# Patient Record
Sex: FEMALE | Race: BLACK OR AFRICAN AMERICAN | URBAN - METROPOLITAN AREA
[De-identification: names, ages, dates, MRNs, and addresses within clinical notes are randomized per-mention and may not be internally consistent; named-entity substitution may affect disease eponyms.]

---

## 2017-01-24 ENCOUNTER — OFFICE VISIT (OUTPATIENT)
Dept: INTERNAL MEDICINE | Facility: CLINIC | Age: 42
End: 2017-01-24
Payer: COMMERCIAL

## 2017-01-24 VITALS
SYSTOLIC BLOOD PRESSURE: 160 MMHG | BODY MASS INDEX: 48.09 KG/M2 | OXYGEN SATURATION: 98 % | HEART RATE: 66 BPM | DIASTOLIC BLOOD PRESSURE: 110 MMHG | HEIGHT: 64 IN | TEMPERATURE: 98.3 F | WEIGHT: 281.7 LBS

## 2017-01-24 DIAGNOSIS — S93.402A MILD SPRAIN OF LEFT ANKLE, INITIAL ENCOUNTER: ICD-10-CM

## 2017-01-24 DIAGNOSIS — I10 BENIGN ESSENTIAL HYPERTENSION: Primary | ICD-10-CM

## 2017-01-24 DIAGNOSIS — N18.30 CHRONIC KIDNEY DISEASE, STAGE III (MODERATE) (H): ICD-10-CM

## 2017-01-24 LAB
ANION GAP SERPL CALCULATED.3IONS-SCNC: 6 MMOL/L (ref 3–14)
BUN SERPL-MCNC: 14 MG/DL (ref 7–30)
CALCIUM SERPL-MCNC: 8.8 MG/DL (ref 8.5–10.1)
CHLORIDE SERPL-SCNC: 110 MMOL/L (ref 94–109)
CO2 SERPL-SCNC: 27 MMOL/L (ref 20–32)
CREAT SERPL-MCNC: 1.51 MG/DL (ref 0.52–1.04)
GFR SERPL CREATININE-BSD FRML MDRD: 38 ML/MIN/1.7M2
GLUCOSE SERPL-MCNC: 91 MG/DL (ref 70–99)
POTASSIUM SERPL-SCNC: 4.2 MMOL/L (ref 3.4–5.3)
SODIUM SERPL-SCNC: 143 MMOL/L (ref 133–144)

## 2017-01-24 PROCEDURE — 36415 COLL VENOUS BLD VENIPUNCTURE: CPT | Performed by: INTERNAL MEDICINE

## 2017-01-24 PROCEDURE — 80048 BASIC METABOLIC PNL TOTAL CA: CPT | Performed by: INTERNAL MEDICINE

## 2017-01-24 PROCEDURE — 99214 OFFICE O/P EST MOD 30 MIN: CPT | Performed by: INTERNAL MEDICINE

## 2017-01-24 NOTE — Clinical Note
[address] [address]  [phone]      01/24/2017      Nikunj Lizama  1975  [address] [facility] [address] [place] [address]        To whom it may concern,    Please excuse Ms. Lizama from work today. She was seeing me to discuss a couple health concerns that I am following her for. She may return to work tomorrow.    Please contact me with any question or concerns.        Britney Jean Baptiste MD   [address] [address]  T: [phone], F: [phone]

## 2017-01-24 NOTE — PROGRESS NOTES
"  SUBJECTIVE:                                                      HPI: Nikunj Lizama is a pleasant 42 year old female who presents for BP follow-up and left ankle pain:    Re: BP:  - lisinopril increased from 20 to 40mg daily at last visit  - diltiazem 120mg was started  - hydrochlorothiazide and amlodipine were stopped    - patient tolerating medications well - no adverse side effects  - not checking BPs at home  - BP significantly elevated today - no change from last visit  - asymptomatic:   - no headaches or vision changes   - no chest pain or palpitations   - no shortness of breath   - no light-headedness     Re: left ankle:  - woke up yesterday to ankle being swollen and painful  - no known precipitating event - no trauma, fall, misstep, or unusual exertion  - describes pain as \"crampy\" - denies soreness/tenderness or sharp pain  - pain is location along proximal, dorsal aspect of foot and lateral aspect of ankle  - pain is worse with weight-bearing - having trouble standing and walking  - has been using ibuprofen - seems to help    - patient presented with similar pain at last visit  - reports history of recurrent left ankle pain    - no redness or skin changes  - no weakness, numbness, or tingling of ankle or foot  - no ankle or foot weakness    - no chest pain or palpitations  - no shortness of breath or cough  - no light-headedness or pre-syncope    The medication, allergy, and problem lists have been reviewed and updated as appropriate.     OBJECTIVE:                                                      /110 mmHg  Pulse 66  Temp(Src) 98.3  F (36.8  C) (Oral)  Ht 5' 4.4\" (1.636 m)  Wt 281 lb 11.2 oz (127.778 kg)  BMI 47.74 kg/m2  SpO2 98%  LMP 01/19/2017  Constitutional: well-appearing  Respiratory: normal respiratory effort; clear to auscultation bilaterally  Cardiovascular: regular rate and rhythm; no edema  Left ankle:  Inspection: mild, non-pitting swelling (compared with right " ankle)  Tender: mild tenderness over ATFL, CFL, PTFL,    Non-tender: medial malleolus, anterior tib-fib ligament, achilles tendon  Range of Motion: dorsiflexion, plantarflexion, inversion, eversion all mildly decreased due to pain  Strength: normal    ASSESSMENT/PLAN:                                                      (I10) Benign essential hypertension  (primary encounter diagnosis)  (N18.3) Chronic kidney disease, stage III (moderate)  Comment:    - poorly controlled at present.   - caution with renally active agents in light of CKD (lisinopril actually recommended in setting of CKD with proteinuria).   Plan:    - BMP today.   - if renal function stable, will continue current regimen, but increase diltiazem to 240mg and add diuretic (Lasix 20mg).   - follow-up BP in 2-4 weeks max.     (S93.402A) Mild sprain of left ankle, initial encounter  Comment: this seems to be a recurring ailment.   Plan:    - fitted with hard walking boot - to wear for next 2 weeks at least.   - keep leg elevated when seated or lying down.   - wear compression dressing during day.   - cool compresses to area 3x/day.   - ibuprofen PRN for pain relief.   - consider course of PT to prevent future recurrences - patient will consider.     The instructions on the AVS were discussed and explained to the patient. Patient expressed understanding of instructions.    Britney Jean Baptiste MD   [place] [address] [place] [address]  T: [phone], F: [phone]

## 2017-01-24 NOTE — MR AVS SNAPSHOT
After Visit Summary   1/24/2017    Nikunj Lizama    MRN: [id number]           Patient Information     Date Of Birth          1975        Visit Information        Provider Department      1/24/2017 10:15 AM Britney Jean Baptiste MD [facility]        Today's Diagnoses     Benign essential hypertension    -  1     Mild sprain of left ankle, initial encounter           Care Instructions    Blood test today.    ---    I will most likely be adding a water pill to your regimen - will call you after blood test result is back.    ---    For left ankle:    - wear hard boot when walking/weight-bearing for next 2 weeks (at least).  - cool compresses to ankle 3x/day.  - wrap ankle in ace bandage  - keep leg elevated when off your feet        Follow-ups after your visit        Your next 10 appointments already scheduled     Jan 31, 2017 10:30 AM   PHYSICAL with Britney Jean Baptiste MD   [facility] ([facility])    [address] [place] [id number] [phone]              Who to contact     If you have questions or need follow up information about today's clinic visit or your schedule please contact [facility] directly at [phone].  Normal or non-critical lab and imaging results will be communicated to you by [employer], letter or phone within 4 business days after the clinic has received the results. If you do not hear from us within 7 days, please contact the clinic through [employer] or phone. If you have a critical or abnormal lab result, we will notify you by phone as soon as possible.  Submit refill requests through [employer] or call your pharmacy and they will forward the refill request to us. Please allow 3 business days for your refill to be completed.          Additional Information About Your Visit        [employer] Information     [employer] lets you send messages to your doctor, view your test  "results, renew your prescriptions, schedule appointments and more. To sign up, go to [email or web address] . Click on \"Log in\" on the left side of the screen, which will take you to the Welcome page. Then click on \"Sign up Now\" on the right side of the page.     You will be asked to enter the access code listed below, as well as some personal information. Please follow the directions to create your username and password.     Your access code is: [email or web address]  Expires: 3/23/2017  3:10 PM     Your access code will  in 90 days. If you need help or a new code, please call your [facility] clinic or [phone].        Care EveryWhere ID     This is your Care EveryWhere ID. This could be used by other organizations to access your [facility] medical records  [id number]        Your Vitals Were     Pulse Temperature Height BMI (Body Mass Index) Pulse Oximetry Last Period    66 98.3  F (36.8  C) (Oral) 5' 4.4\" (1.636 m) 47.74 kg/m2 98% 2017       Blood Pressure from Last 3 Encounters:   17 160/110   16 160/110    Weight from Last 3 Encounters:   17 281 lb 11.2 oz (127.778 kg)   16 281 lb (127.461 kg)              We Performed the Following     Basic metabolic panel          Today's Medication Changes          These changes are accurate as of: 17 10:38 AM.  If you have any questions, ask your nurse or doctor.               Start taking these medicines.        Dose/Directions    order for DME   Used for:  Mild sprain of left ankle, initial encounter   Started by:  Britney Jean Baptiste MD        Equipment being ordered: left CAM boot   Quantity:  1 Units   Refills:  0            Where to get your medicines      Some of these will need a paper prescription and others can be bought over the counter.  Ask your nurse if you have questions.     Bring a paper prescription for each of these medications    - order for DME             Primary Care Provider    None Specified       No primary " provider on file.        Thank you!     Thank you for choosing [facility]  for your care. Our goal is always to provide you with excellent care. Hearing back from our patients is one way we can continue to improve our services. Please take a few minutes to complete the written survey that you may receive in the mail after your visit with us. Thank you!             Your Updated Medication List - Protect others around you: Learn how to safely use, store and throw away your medicines at www.disposemymeds.org.          This list is accurate as of: 1/24/17 10:38 AM.  Always use your most recent med list.                   Brand Name Dispense Instructions for use    diltiazem 120 MG 24 hr capsule    DILACOR XR    30 capsule    Take 1 capsule (120 mg) by mouth daily       lisinopril 40 MG tablet    PRINIVIL/ZESTRIL    30 tablet    Take 1 tablet (40 mg) by mouth daily       order for DME     1 Units    Equipment being ordered: left CAM boot

## 2017-01-24 NOTE — NURSING NOTE
"Chief Complaint   Patient presents with     Musculoskeletal Problem     x 1 day. Pain on the L foot to back of heel.       Initial /110 mmHg  Pulse 66  Temp(Src) 98.3  F (36.8  C) (Oral)  Ht 5' 4.4\" (1.636 m)  Wt 281 lb 11.2 oz (127.778 kg)  BMI 47.74 kg/m2  SpO2 98%  LMP 01/19/2017 Estimated body mass index is 47.74 kg/(m^2) as calculated from the following:    Height as of this encounter: 5' 4.4\" (1.636 m).    Weight as of this encounter: 281 lb 11.2 oz (127.778 kg).  BP completed using cuff size: large      Kaminibose MA      "

## 2017-01-24 NOTE — PATIENT INSTRUCTIONS
Blood test today.    ---    I will most likely be adding a water pill to your regimen - will call you after blood test result is back.    ---    For left ankle:    - wear hard boot when walking/weight-bearing for next 2 weeks (at least).  - cool compresses to ankle 3x/day.  - wrap ankle in ace bandage  - keep leg elevated when off your feet

## 2017-01-25 ENCOUNTER — TELEPHONE (OUTPATIENT)
Dept: INTERNAL MEDICINE | Facility: CLINIC | Age: 42
End: 2017-01-25

## 2017-01-25 DIAGNOSIS — I10 BENIGN ESSENTIAL HYPERTENSION: Primary | ICD-10-CM

## 2017-01-25 DIAGNOSIS — N18.30 CHRONIC KIDNEY DISEASE, STAGE III (MODERATE) (H): ICD-10-CM

## 2017-01-25 RX ORDER — LISINOPRIL 40 MG/1
40 TABLET ORAL DAILY
Qty: 30 TABLET | Refills: 1 | Status: SHIPPED | OUTPATIENT
Start: 2017-01-25 | End: 2017-05-15

## 2017-01-25 RX ORDER — FUROSEMIDE 20 MG
20 TABLET ORAL DAILY
Qty: 30 TABLET | Refills: 1 | Status: SHIPPED | OUTPATIENT
Start: 2017-01-25

## 2017-01-25 RX ORDER — DILTIAZEM HYDROCHLORIDE 240 MG/1
240 CAPSULE, EXTENDED RELEASE ORAL DAILY
Qty: 30 CAPSULE | Refills: 1 | Status: SHIPPED | OUTPATIENT
Start: 2017-01-25 | End: 2017-03-16

## 2017-01-25 NOTE — TELEPHONE ENCOUNTER
Labs show stable to improved Cr.    Will continue lisinopril 40mg daily.    Will increase diltiazem XR to 240mg daily (from 120mg daily)    Will add Lasix 20mg daily.    Patient to follow-up in 2-4 weeks for BP check and BMP.

## 2017-03-16 DIAGNOSIS — I10 BENIGN ESSENTIAL HYPERTENSION: ICD-10-CM

## 2017-03-16 RX ORDER — DILTIAZEM HYDROCHLORIDE 240 MG/1
240 CAPSULE, EXTENDED RELEASE ORAL DAILY
Qty: 30 CAPSULE | Refills: 1 | Status: SHIPPED | OUTPATIENT
Start: 2017-03-16

## 2017-03-16 NOTE — TELEPHONE ENCOUNTER
Routing refill request to provider for review/approval because:  BP out of range  No ALT labs

## 2017-03-16 NOTE — TELEPHONE ENCOUNTER
diltiazem (DILACOR XR) 240 MG 24 hr capsule         Last Written Prescription Date: 01/25/2017  Last Fill Quantity: 30, # refills: 01    Last Office Visit with G, P or [facility] prescribing provider:  01/24/2017   Future Office Visit:      BP Readings from Last 3 Encounters:   01/24/17 (!) 160/110   12/23/16 (!) 160/110     No results found for: ALT  No results found for: CHOL  No results found for: HDL  No results found for: LDL  No results found for: TRIG  No results found for: CHOLHDLRATIO

## 2017-05-12 DIAGNOSIS — N18.30 CHRONIC KIDNEY DISEASE, STAGE III (MODERATE) (H): ICD-10-CM

## 2017-05-12 DIAGNOSIS — I10 BENIGN ESSENTIAL HYPERTENSION: ICD-10-CM

## 2017-05-12 RX ORDER — LISINOPRIL 40 MG/1
40 TABLET ORAL DAILY
Qty: 30 TABLET | Refills: 1 | OUTPATIENT
Start: 2017-05-12

## 2017-05-12 NOTE — TELEPHONE ENCOUNTER
Routing refill request to provider for review/approval because:  Labs out of range:  Creatinine, blood pressure

## 2017-05-12 NOTE — TELEPHONE ENCOUNTER
lisinopril (PRINIVIL/ZESTRIL) 40 MG tablet    Last Written Prescription Date: 01/25/2017  Last Fill Quantity: 30, # refills: 1  Last Office Visit with [facility], [facility] or [facility] prescribing provider: 01/24/2017       Potassium   Date Value Ref Range Status   01/24/2017 4.2 3.4 - 5.3 mmol/L Final     Creatinine   Date Value Ref Range Status   01/24/2017 1.51 (H) 0.52 - 1.04 mg/dL Final     BP Readings from Last 3 Encounters:   01/24/17 (!) 160/110   12/23/16 (!) 160/110

## 2017-05-15 RX ORDER — LISINOPRIL 40 MG/1
40 TABLET ORAL DAILY
Qty: 30 TABLET | Refills: 0 | Status: SHIPPED | OUTPATIENT
Start: 2017-05-15

## 2017-05-15 NOTE — TELEPHONE ENCOUNTER
Patient is overdue for blood pressure check and BMP.    Will give one month refill, but no more - patient must make appointment to see me before additional refills provided.

## 2017-05-17 ENCOUNTER — HOSPITAL (OUTPATIENT)
Dept: OTHER | Age: 42
End: 2017-05-17
Attending: EMERGENCY MEDICINE

## 2017-07-29 ENCOUNTER — HOSPITAL (OUTPATIENT)
Dept: OTHER | Age: 42
End: 2017-07-29
Attending: EMERGENCY MEDICINE

## 2017-07-29 LAB
ANION GAP SERPL CALC-SCNC: 10 MMOL/L (ref 10–20)
BUN SERPL-MCNC: 20 MG/DL (ref 6–20)
BUN/CREAT SERPL: 12 (ref 7–25)
CALCIUM SERPL-MCNC: 9 MG/DL (ref 8.4–10.2)
CHLORIDE: 107 MMOL/L (ref 98–107)
CO2 SERPL-SCNC: 24 MMOL/L (ref 21–32)
CREAT SERPL-MCNC: 1.67 MG/DL (ref 0.51–0.95)
GLUCOSE SERPL-MCNC: 92 MG/DL (ref 65–99)
POTASSIUM SERPL-SCNC: 3.8 MMOL/L (ref 3.4–5.1)
SODIUM SERPL-SCNC: 137 MMOL/L (ref 135–145)

## 2017-08-01 ENCOUNTER — DIAGNOSTIC TRANS (OUTPATIENT)
Dept: OTHER | Age: 42
End: 2017-08-01

## 2019-08-08 ENCOUNTER — HOSPITAL (OUTPATIENT)
Dept: OTHER | Age: 44
End: 2019-08-08

## 2019-08-08 LAB
APPEARANCE UR: CLEAR
BILIRUB UR QL STRIP: NEGATIVE
COLOR UR: YELLOW
GLUCOSE UR STRIP-MCNC: NEGATIVE MG/DL
HCG POINT OF CARE (5HGRST): NEGATIVE
HEMOCCULT STL QL: ABNORMAL
KETONES UR STRIP-MCNC: NEGATIVE MG/DL
LEUKOCYTE ESTERASE UR QL STRIP: NEGATIVE
NITRITE UR QL STRIP: NEGATIVE
PH UR STRIP: 5.5 UNITS (ref 5–7)
PROT UR STRIP-MCNC: 30 MG/DL
SP GR UR STRIP: 1.01 (ref 1–1.03)
UROBILINOGEN UR STRIP-MCNC: 0.2 MG/DL (ref 0–1)

## 2019-08-08 PROCEDURE — 99283 EMERGENCY DEPT VISIT LOW MDM: CPT | Performed by: EMERGENCY MEDICINE
